# Patient Record
(demographics unavailable — no encounter records)

---

## 2024-11-09 NOTE — HISTORY OF PRESENT ILLNESS
[de-identified] : 38 y/o M p/w persistent R ear clogged sensation for the past x2 weeks. He was seen by his PCP yesterday who told him that his ears were filled with wax. He recently was dx'd with sinusitis with and was placed on x10 day course of augmentin (finished course x3 days ago) with improvement of symptoms. He reports persistent post-nasal drip that triggers him to cough. 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 He has h/o adenoidectomy during childhood. Father also has postnasal drip. nonsmoker.

## 2024-11-09 NOTE — HISTORY OF PRESENT ILLNESS
[de-identified] : 40 y/o M p/w persistent R ear clogged sensation for the past x2 weeks. He was seen by his PCP yesterday who told him that his ears were filled with wax. He recently was dx'd with sinusitis with and was placed on x10 day course of augmentin (finished course x3 days ago) with improvement of symptoms. He reports persistent post-nasal drip that triggers him to cough. 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 He has h/o adenoidectomy during childhood. Father also has postnasal drip. nonsmoker.

## 2024-11-09 NOTE — PHYSICAL EXAM
[] : septum deviated to the right [Midline] : trachea located in midline position [Normal] : normal appearance, well groomed, well nourished, and in no acute distress [de-identified] : b copious cerumen noted, removed atraumatically with suction under the microscope, b tm intact [Nasal Endoscopy Performed] : nasal endoscopy was performed, see procedure section for findings [de-identified] : mild ar [de-identified] : gait steady

## 2024-11-09 NOTE — ASSESSMENT
[FreeTextEntry1] : 1. ceurmen impaction -b copious cerumen noted, removed atraumatically with suction under the microscope, b tm intact -ears felt better -avoid qtip use  2. post-nasal drip- mild a.r. -advised flonase - patient prefers to hold off due to side effects, avoidanc -advised patient to start on azelastine or atrovent - patient prefers to research on medications prior to starting, patient will call office if he is interested -saline spray rtc as needed

## 2024-11-09 NOTE — PROCEDURE
[Complicated Symptoms] : complicated symptoms requiring more thorough examination than provided by mirror [Present] : absent [de-identified] : done to eval for reflux laryngitis  [Risk and Benefits Discussed] : The purpose, risks, discomforts, benefits and alternatives of the procedure have been explained to the patient including no treatment. [Cerumen Impaction] : Cerumen Impaction [Same] : same as the Pre Op Dx. [] : Removal of Cerumen [Posterior Lesion] : posterior lesion [Anterior rhinoscopy insufficient to account for symptoms] : anterior rhinoscopy insufficient to account for symptoms [Topical Lidocaine] : topical lidocaine [Oxymetazoline HCl] : oxymetazoline HCl [Flexible Endoscope] : examined with the flexible endoscope [Serial Number: ___] : Serial Number: [unfilled] [Allergic] : allergic signs [Normal] : the paranasal sinuses had no abnormalities [FreeTextEntry6] : b copious cerumen noted, removed atraumatically with suction under the microscope, b tm intact

## 2024-11-09 NOTE — PHYSICAL EXAM
[] : septum deviated to the right [Midline] : trachea located in midline position [Normal] : normal appearance, well groomed, well nourished, and in no acute distress [de-identified] : b copious cerumen noted, removed atraumatically with suction under the microscope, b tm intact [Nasal Endoscopy Performed] : nasal endoscopy was performed, see procedure section for findings [de-identified] : mild ar [de-identified] : gait steady

## 2024-11-09 NOTE — PROCEDURE
[Complicated Symptoms] : complicated symptoms requiring more thorough examination than provided by mirror [Present] : absent [de-identified] : done to eval for reflux laryngitis  [Risk and Benefits Discussed] : The purpose, risks, discomforts, benefits and alternatives of the procedure have been explained to the patient including no treatment. [Cerumen Impaction] : Cerumen Impaction [Same] : same as the Pre Op Dx. [] : Removal of Cerumen [Posterior Lesion] : posterior lesion [Anterior rhinoscopy insufficient to account for symptoms] : anterior rhinoscopy insufficient to account for symptoms [Topical Lidocaine] : topical lidocaine [Oxymetazoline HCl] : oxymetazoline HCl [Flexible Endoscope] : examined with the flexible endoscope [Serial Number: ___] : Serial Number: [unfilled] [Allergic] : allergic signs [Normal] : the paranasal sinuses had no abnormalities [FreeTextEntry6] : b copious cerumen noted, removed atraumatically with suction under the microscope, b tm intact

## 2025-05-19 NOTE — HISTORY OF PRESENT ILLNESS
[de-identified] : 6 mo follow up visit for this 39 y/o M with h/o allergic rhinitis and recurrent cerumen impaction. States that on April 4th he had a URI and was seen by a medical provider, dx'd with sinusitis and tx'd with augmentin. He then lost his voice x1 week later and was seen by ENT (Dr. Vu Hays) and was tx'd with prednisone, flonase, and cefdinir with resolution of symptoms. He then started to experience sneezing and watery eyes last Tuesday and was seen by Dr. Hays again, and was dx'd with sinusitis and tx'd with cefinir (x10 days) and flonase. Notes that Dr. Hays saw pus from left nasal cavity on endoscopy. He states that he is overall feels better. He currently feels wetness to R ear which he describes as if there is water in his ear for the past x48 hours. States symptoms are reproduced when he lateral flexes his neck to the right.. He does not currently feel the water in his ear and states thaty he may have gotten water in the ear when he felt the sensaiton of it. Reports h/o pe tubes d/t recurrent childhood ear infections.  Denies h/o recurrent sinusitis, fever, otorrhea, otalgia, hearing loss.

## 2025-05-19 NOTE — PROCEDURE
[Cerumen Impaction] : Cerumen Impaction [Same] : same as the Pre Op Dx. [] : Removal of Cerumen [Topical Lidocaine] : topical lidocaine [Oxymetazoline HCl] : oxymetazoline HCl [Flexible Endoscope] : examined with the flexible endoscope [Posterior Lesion] : posterior lesion [Anterior rhinoscopy insufficient to account for symptoms] : anterior rhinoscopy insufficient to account for symptoms [Serial Number: ___] : Serial Number: [unfilled] [Normal] : the nasopharynx was normal [de-identified] : done to ro sinusitis due to pt sx, could not see with speculum [FreeTextEntry6] :  b copious cerumen noted, removed atraumatically with suction, b tm intact

## 2025-05-19 NOTE — ASSESSMENT
[FreeTextEntry1] : 1. recurrent sinusitis -resolving -continue cefinir -CT sinus recommended due to his concern about 2 sinus infections in a row, he wished to hold off  2. cerumen impaction -  cerumen removed -ear felt better -avoid qtip use reassured no liquid in his ear- asked to call if symptom redevelops  -RTC in x6 mo or earlier as needed

## 2025-05-19 NOTE — HISTORY OF PRESENT ILLNESS
[de-identified] : 6 mo follow up visit for this 41 y/o M with h/o allergic rhinitis and recurrent cerumen impaction. States that on April 4th he had a URI and was seen by a medical provider, dx'd with sinusitis and tx'd with augmentin. He then lost his voice x1 week later and was seen by ENT (Dr. Vu Hays) and was tx'd with prednisone, flonase, and cefdinir with resolution of symptoms. He then started to experience sneezing and watery eyes last Tuesday and was seen by Dr. Hays again, and was dx'd with sinusitis and tx'd with cefinir (x10 days) and flonase. Notes that Dr. Hays saw pus from left nasal cavity on endoscopy. He states that he is overall feels better. He currently feels wetness to R ear which he describes as if there is water in his ear for the past x48 hours. States symptoms are reproduced when he lateral flexes his neck to the right.. He does not currently feel the water in his ear and states thaty he may have gotten water in the ear when he felt the sensaiton of it. Reports h/o pe tubes d/t recurrent childhood ear infections.  Denies h/o recurrent sinusitis, fever, otorrhea, otalgia, hearing loss.

## 2025-05-19 NOTE — PROCEDURE
[Cerumen Impaction] : Cerumen Impaction [Same] : same as the Pre Op Dx. [] : Removal of Cerumen [Topical Lidocaine] : topical lidocaine [Oxymetazoline HCl] : oxymetazoline HCl [Flexible Endoscope] : examined with the flexible endoscope [Posterior Lesion] : posterior lesion [Anterior rhinoscopy insufficient to account for symptoms] : anterior rhinoscopy insufficient to account for symptoms [Serial Number: ___] : Serial Number: [unfilled] [Normal] : the nasopharynx was normal [de-identified] : done to ro sinusitis due to pt sx, could not see with speculum [FreeTextEntry6] :  b copious cerumen noted, removed atraumatically with suction, b tm intact

## 2025-05-19 NOTE — PHYSICAL EXAM
[] : septum deviated to the left [Midline] : trachea located in midline position [Normal] : no rashes [de-identified] : b tympanosclerosis,  b copious cerumen noted, removed atraumatically with suction, b tm intact [de-identified] : gait steady

## 2025-05-19 NOTE — PHYSICAL EXAM
[] : septum deviated to the left [Midline] : trachea located in midline position [Normal] : no rashes [de-identified] : b tympanosclerosis,  b copious cerumen noted, removed atraumatically with suction, b tm intact [de-identified] : gait steady